# Patient Record
Sex: FEMALE | Race: WHITE | HISPANIC OR LATINO | Employment: PART TIME
[De-identification: names, ages, dates, MRNs, and addresses within clinical notes are randomized per-mention and may not be internally consistent; named-entity substitution may affect disease eponyms.]

---
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
## 2024-04-17 ENCOUNTER — OFFICE VISIT (OUTPATIENT)
Dept: FAMILY MEDICINE | Facility: CLINIC | Age: 19
End: 2024-04-17
Payer: COMMERCIAL

## 2024-04-17 VITALS
HEART RATE: 82 BPM | OXYGEN SATURATION: 97 % | DIASTOLIC BLOOD PRESSURE: 66 MMHG | HEIGHT: 67 IN | SYSTOLIC BLOOD PRESSURE: 96 MMHG | WEIGHT: 163 LBS | BODY MASS INDEX: 25.58 KG/M2 | RESPIRATION RATE: 18 BRPM | TEMPERATURE: 98.1 F

## 2024-04-17 DIAGNOSIS — N92.6 MISSED MENSES: Primary | ICD-10-CM

## 2024-04-17 PROBLEM — O21.0 HYPEREMESIS GRAVIDARUM: Status: RESOLVED | Noted: 2022-07-29 | Resolved: 2024-04-17

## 2024-04-17 LAB — HCG UR QL: POSITIVE

## 2024-04-17 PROCEDURE — 99213 OFFICE O/P EST LOW 20 MIN: CPT | Mod: GC

## 2024-04-17 PROCEDURE — 81025 URINE PREGNANCY TEST: CPT

## 2024-04-17 ASSESSMENT — ANXIETY QUESTIONNAIRES
7. FEELING AFRAID AS IF SOMETHING AWFUL MIGHT HAPPEN: MORE THAN HALF THE DAYS
1. FEELING NERVOUS, ANXIOUS, OR ON EDGE: NEARLY EVERY DAY
2. NOT BEING ABLE TO STOP OR CONTROL WORRYING: NEARLY EVERY DAY
IF YOU CHECKED OFF ANY PROBLEMS ON THIS QUESTIONNAIRE, HOW DIFFICULT HAVE THESE PROBLEMS MADE IT FOR YOU TO DO YOUR WORK, TAKE CARE OF THINGS AT HOME, OR GET ALONG WITH OTHER PEOPLE: VERY DIFFICULT
6. BECOMING EASILY ANNOYED OR IRRITABLE: MORE THAN HALF THE DAYS
3. WORRYING TOO MUCH ABOUT DIFFERENT THINGS: MORE THAN HALF THE DAYS
GAD7 TOTAL SCORE: 17
GAD7 TOTAL SCORE: 17
5. BEING SO RESTLESS THAT IT IS HARD TO SIT STILL: MORE THAN HALF THE DAYS

## 2024-04-17 ASSESSMENT — PATIENT HEALTH QUESTIONNAIRE - PHQ9
SUM OF ALL RESPONSES TO PHQ QUESTIONS 1-9: 12
5. POOR APPETITE OR OVEREATING: NEARLY EVERY DAY

## 2024-04-17 NOTE — PATIENT INSTRUCTIONS
CRISIS LINES/SERVICES  If in Immediate danger please go to the ER and/or call 9-1-1  Feeling overwhelmed and just need a supportive person to talk through a struggling time?   Toll Free [phone] or text  Support  to 25232 (hours 12 PM - 10 PM CST)  The [place] Warmline provides a naqc-[id number] approach to mental health recovery, support and wellness. Calls are answered by professionally trained Certified Peer Specialists, who have first hand experience living with a mental health condition. (hours 12 PM - 10 PM CST)    Do you feel like you might be in crisis and potentially need professional services?   National Crisis Lines:  988 - National Suicide Prevention Lifeline  6-723-RDDCUAR (1-808.355.9460) - www.IActive  5-962-294-TALK (8301) - www.suicidepreventionlifeline.org  [place]:  Crisis Text Line: Text [place] to 877401. Free support at your fingertips 24/7  People who text [place] to 269048 will be connected with a counselor. Crisis Text Line is available 24 hours a day, seven days a week.  [facility] Multilingual Crisis Line:  [phone]  [facility]:  COPE - Adult (psychiatric crisis, but cannot transport self): [phone]   COPE - Child: [phone]  Acute Psychiatric Services - [facility] (24 hour crisis walk-in and crisis line): [phone]  [address], [place]  [facility] (Emergency Psychiatric Evaluation): [phone]  [facility]:  [facility] Adult Crisis Line (crisis counseling and walk-in urgent care mental health): [phone]   Adult Residential Crisis Centers:   [employer] operates two Adult Residential Crisis Centers in the area: [facility] ([place]) and [facility] ([place])  These facilities are a step below in-patient hospital care, providing a three to ten-day stay for individuals who are at a moderate but not a high risk for self-harm. The crisis centers and other [employer] programs can be reached through their  central screening at 355-544-6968.  Domestic Violence:  [place] Domestic Violence Crisis Line (24-hour [place] crisis line) 1-432.423.7093    If in Immediate danger please go to the ER and/or call 9-1-1

## 2024-04-17 NOTE — PROGRESS NOTES
Assessment & Plan     Missed menses  19-year-old  presents with positive urine pregnancy test at home and report of her last menstrual period about 5 months ago in the setting of irregular menses.  She also has had associated nausea with some vomiting and overall fatigue.  Urine pregnancy test in office today is positive.  Patient would not like to continue the pregnancy.  Discussed obtaining dating ultrasound given unsure LMP and gestational age.  OB RN met with patient to schedule ultrasound and will follow-up on ultrasound results on 2024 and discuss next steps with patient.  - HCG qualitative urine  - US OB <14 WKS SINGLE OR FIRST GESTATION      Depression Screening Follow Up      2023     1:59 PM 2023     3:26 PM 2024     1:38 PM   PHQ   PHQ-9 Total Score 17 16 12   Q9: Thoughts of better off dead/self-harm past 2 weeks Not at all Not at all Several days        Follow Up Actions Taken  Crisis resource information provided in the After Visit Summary    Discussed the following ways the patient can remain in a safe environment:  be around others    Return in about 1 week (around 2024), or if symptoms worsen or fail to improve.    Lucius Fuentes is a 19 year old, presenting for the following health issues:  Pregnancy Test (Home test positive )        2024    11:15 AM   Additional Questions   Roomed by Ml   Accompanied by child         2024    Information    services provided? No     HPI     Patient is  1 and para 1.    Past Medical History:   Diagnosis Date    Moderate episode of recurrent major depressive disorder (H)      Patient reports last menstrual period was about 5 months ago.  She reports irregular menstrual periods.    Sexually Active: YES  Using pill for birth control previously, stopped about 5 months ago    Painful urination (Dysuria): No  Urinary frequency: No  Vaginal discharge: No  Vaginal bleeding or spotting: No  Nausea  "and vomiting: YES- vomiting almost daily for 1 week, now improved  Fatigue: YES  Taking prenatal vitamin: No    Does not wish to continue the pregnancy.  She states it has been stressful finding out that she has been pregnant as she had a really difficult pregnancy with her first child and thinks that the father of this baby may be her ex partner.         Objective    BP 96/66 (BP Location: Right arm, Patient Position: Sitting, Cuff Size: Adult Regular)   Pulse 82   Temp 98.1  F (36.7  C) (Tympanic)   Resp 18   Ht 1.702 m (5' 7\")   Wt 73.9 kg (163 lb)   LMP  (LMP Unknown)   SpO2 97%   BMI 25.53 kg/m    Body mass index is 25.53 kg/m .  Physical Exam   Constitutional: healthy, alert, no distress, and cooperative  Head: normocephalic  Cardiovascular: appears well perfused  Respiratory: breathing comfortably on RA  Musculoskeletal: extremities normal- no gross deformities noted, gait normal  Skin: no suspicious lesions or rashes on exposed skin  Neurologic: grossly normal CN  Psychiatric: mentation appears normal and affect flat        Signed Electronically by: Antonina Shields MD    "

## 2024-04-18 ENCOUNTER — HOSPITAL ENCOUNTER (OUTPATIENT)
Dept: ULTRASOUND IMAGING | Facility: CLINIC | Age: 19
Discharge: HOME | End: 2024-04-18
Attending: FAMILY MEDICINE | Admitting: FAMILY MEDICINE
Payer: COMMERCIAL

## 2024-04-18 DIAGNOSIS — N92.6 MISSED MENSES: ICD-10-CM

## 2024-04-18 PROCEDURE — 76801 OB US < 14 WKS SINGLE FETUS: CPT

## 2024-04-19 ENCOUNTER — TELEPHONE (OUTPATIENT)
Dept: FAMILY MEDICINE | Facility: CLINIC | Age: 19
End: 2024-04-19
Payer: COMMERCIAL

## 2024-04-23 NOTE — TELEPHONE ENCOUNTER
Called pt and assisted her with rescheduling [facility] appt to tomorrow at 11:20 AM with Dr Sexton.  Routed note to Dr Sexton./Shana Bales RN BSN

## 2024-04-24 ENCOUNTER — TELEPHONE (OUTPATIENT)
Dept: FAMILY MEDICINE | Facility: CLINIC | Age: 19
End: 2024-04-24

## 2024-04-24 ENCOUNTER — OFFICE VISIT (OUTPATIENT)
Dept: FAMILY MEDICINE | Facility: CLINIC | Age: 19
End: 2024-04-24
Payer: COMMERCIAL

## 2024-04-24 VITALS
OXYGEN SATURATION: 99 % | WEIGHT: 159 LBS | RESPIRATION RATE: 16 BRPM | DIASTOLIC BLOOD PRESSURE: 70 MMHG | BODY MASS INDEX: 24.9 KG/M2 | TEMPERATURE: 98 F | SYSTOLIC BLOOD PRESSURE: 106 MMHG | HEART RATE: 93 BPM

## 2024-04-24 DIAGNOSIS — Z11.3 SCREENING FOR STDS (SEXUALLY TRANSMITTED DISEASES): ICD-10-CM

## 2024-04-24 DIAGNOSIS — Z11.59 NEED FOR HEPATITIS C SCREENING TEST: Primary | ICD-10-CM

## 2024-04-24 DIAGNOSIS — Z33.2 TERMINATION OF PREGNANCY (FETUS): ICD-10-CM

## 2024-04-24 PROCEDURE — S0190 MIFEPRISTONE, ORAL, 200 MG: HCPCS | Performed by: FAMILY MEDICINE

## 2024-04-24 PROCEDURE — 99214 OFFICE O/P EST MOD 30 MIN: CPT | Performed by: FAMILY MEDICINE

## 2024-04-24 RX ORDER — MIFEPRISTONE 200 MG/1
200 TABLET ORAL ONCE
Status: COMPLETED | OUTPATIENT
Start: 2024-04-24 | End: 2024-04-24

## 2024-04-24 RX ORDER — MISOPROSTOL 200 UG/1
800 TABLET ORAL DAILY
Qty: 8 TABLET | Refills: 0 | Status: SHIPPED | OUTPATIENT
Start: 2024-04-24

## 2024-04-24 RX ORDER — MISOPROSTOL 200 UG/1
800 TABLET ORAL EVERY 4 HOURS
Qty: 8 TABLET | Refills: 0 | Status: SHIPPED | OUTPATIENT
Start: 2024-04-24 | End: 2024-04-24

## 2024-04-24 RX ADMIN — MIFEPRISTONE 200 MG: 200 TABLET ORAL at 12:00

## 2024-04-24 NOTE — PATIENT INSTRUCTIONS
MEDICATION TERMINATION OF PREGNANCY USING MIFEPRISTONE AND MISOPROSTOL    HOW DOES IT WORK?    Mifepristone and misoprostol together are medications that can be taken to end your pregnancy.      HOW WELL DOES THE MEDICATION WORK?    Medication  is highly effective. Medication  is highly effective; it has a success rate of >95% using the standard protocol. Medication  is safe. Serious complications requiring hospitalization for infection treatment or transfusion occur in <0.4% of patients under the standard protocol. Failed or incomplete medication  may require a suction procedure to complete.    WHAT DO I DO?    You took one tablet of 200 mg mifepristone today during your visit or will take it at home as directed by your provider.   After taking the mifepristone, use the misoprostol.  There are two ways to take misoprostol: vaginal or buccal (between cheek and gum in your mouth).   Vaginal Use of Misoprostol (may be inserted immediately or up to 48 hrs after mifepristone)   Wash your hands and lie down. Place the 4 misoprostol tablets one at a time up into the vagina as far as you can using your finger. It doesn t matter where in the vagina you put the pills. Each misoprostol pill contains 200 micrograms.    If your provider has recommended a second dose of misoprostol, repeat the process with an additional 4 tablets of misoprostol. If you have started to bleed, you can put the pills in your cheeks (between your cheek and your gums), instead of your vagina, for 30 minutes. See  Buccal Use of Misoprostol  below.    Buccal Use of Misoprostol (should be taken between 24-48 hrs after mifepristone)   Place 2 tablets of misoprostol in each cheek (between your cheek and your gums), four tablets total.    Leave them in your cheeks for 30 minutes to dissolve.    After 30 minutes swallow the pills with a large glass of water.   If your provider has recommended a second dose of misoprostol,  repeat steps i. through iii. Above.   You may take ibuprofen (800 mg total) and/or acetaminophen (650mg) by mouth one hour before using the misoprostol. This may help with cramping. See further information on pain management below.       WHAT HAPPENS NEXT?   Vaginal bleeding with clots is an expected part of this process. The cramps and bleeding may be stronger than your normal period. The cramps and heavy bleeding usually start 2 to 4 hours after you use the misoprostol pills. This heavy bleeding means the pills are working. After the pregnancy tissue passes, the bleeding will slow down and get lighter and lighter. It is normal to pass small clots and sometimes the bleeding may seem to increase when you get up suddenly or go to the toilet. Bleeding may continue on and off for up to 4 weeks.     Lower abdominal cramping pain, especially in the middle of your lower abdomen can occur any time after you take the misoprostol.? Cramping may be similar or sometimes much greater than with a menstrual period and can last for a couple of days. If you continue to have pain and cramps after taking the ibuprofen (as directed above), then you can use additional pain medicine such as acetaminophen (Tylenol).?   Nausea, diarrhea: These symptoms should get better a few hours after using the pills and should not last longer than 24 hours.    Fever and chills: You may have fever and chills the day you take the misoprostol. It is NOT normal to have a fever after that. Call us right away if you do. It could be a sign that you are getting an infection.   You will receive a phone call from a clinic nurse within a week of your visit.    You can expect a period in 4 to 8 weeks after using mifepristone and misoprostol but this varies quite a bit depending upon a person s normal menstrual cycle.      WHAT CAN I TAKE FOR PAIN, NAUSEA, DIARRHEA?    Pain:    Take ibuprofen (Motrin or Advil) 800 mg every 8 hours as needed for pain. Take this  with food to avoid an upset stomach. You may also alternate this with acetaminophen (Tylenol) 650mg every 6 hours for added pain control.   Comfort: A hot pack may help with cramps. You can also drink some hot tea. Rest in a soothing place.    Nausea   Take ondansetron or promethazine as needed for nausea and vomiting as needed for nausea and vomiting if prescribed.   Diarrhea   Take Loperamide as needed for diarrhea. Take 2 capsules after the first loose bowel movement. Can take 1 capsule after each additional loose stool. Do not take more than 8 capsules in a day.     WHEN SHOULD I CALL MY HEALTHCARE PROVIDER?    Your bleeding soaks through more than 2 maxi pads per hour for 2 hours in a row   You do not bleed within 24 hours after taking the misoprostol   You continue to feel sick more than 24 hours after taking the misoprostol:   Fever on an oral thermometer of 100.4 degrees Fahrenheit, severe stomach area (abdominal) pain, weakness, nausea, vomiting, or diarrhea     The clinic phone is answered 24 hours per day. If it is after clinic hours, leave a message with the answering service and a health care provider will call you back. Please call if you have any questions, think something is going wrong, or think you have an emergency. If you do not receive a call back within an hour, go to the nearest emergency room.              FEELINGS AFTER ENDING PREGNANCY    People have many different feelings after using the medications to end a pregnancy. The most common emotion people report is relief, but people can also feel many other emotions. If you think your emotions are not what they should be, please talk to us.        References:   American College of Obstetricians and Gynecologists  Committee on Practice Bulletins--Gynecology, Society of Family Planning. Medication  Up to 70 Days of Gestation: ACOG Practice Bulletin, Number 225. Obstet Gynecol. 2020;136(4):e31-e47. doi: 10.1097/AOG.[id number].     Reproductive Health Access Project. Medication  Aftercare Instructions (vaginal miso). May 2022. Available at: https://www.reproductiveaccess.org/wp-content/uploads/[email or web address]_CcxgnuwRcwjJcr-final.pdf   Reproductive Health Access Project. Medication  Aftercare Instructions (buccal miso). May 2022. Available at: https://www.reproductiveaccess.org/wp-content/uploads/-english-buccal-med-ab-aftercare_final.pdf

## 2024-04-24 NOTE — PROGRESS NOTES
2024     Assessment and Plan:  Gina Velasco is a 19 year old  at 9w5d as dated by US who desires termination of pregnancy with medication .     Based the pre-medication  ultrasound assessment, ultrasound prior to  IS indicated, and it was completed on 24.    The patient is appropriate for medication  with:  mifepristone and misoprostol.     Prior to the administration/prescribing of mifepristone, the patient received the medication guide and signed the patient agreement.      Mifepristone administered in clinic (Lot #[id number]; exp Oct 2027). Patient plans to take misoprostol by buccal route.   As patient is 9+0 - 11+0 weeks gestation, two doses of buccal misoprostol are recommended.    Counseled patient on   - expected bleeding: Bleeding typically starts 2-4 hours after misoprostol and can be heavy for up to 2 hours. Once pregnancy tissue passes, bleeding should slow down to menstrual type flow but can last up to 2 weeks. Patient counseled to contact our clinic or present to Emergency Department in the case of heavy bleeding (soaking more than two maxi pads per hour for 2 consecutive hours). Also counseled to contact clinic if no bleeding within 24 hours after taking misoprostol.  - pain: Counseled patient that the most severe pain occurs approximately 2-4 hours after misoprostol use and lasts 1-2 hours. Advised patient to take ibuprofen 800 mg with misoprostol and repeat as needed every 8 hours. Patient may also alternate with acetaminophen for added pain control (acetaminophen 650 mg every 6 hours).   - potential side effects of misoprostol: nausea, vomiting, diarrhea, headache, dizziness, and thermoregulatory effects such as fever, warmth, hot flushes, or chills  - follow up plan options:   Home Pregnancy Test - Take a urine pregnancy test four weeks after taking  medication(s).  Blood Test - Get a blood test on the day of your appointment or the day  you take your first dose of medication to measure the amount of pregnancy hormone (HCG) in your blood.  Get another HCG blood test 1-2 weeks after taking  medication(s).   Ultrasound - Get a vaginal ultrasound 1-2 weeks after taking  medication(s).  Patient plans a home pregnancy test  for follow up.    Chart routed to RN team (TOP Triage [id number]) who will follow up with patient via telephone within 1 week after clinic visit to assess patient.    Patricia Sexton MD     Subjective:  Gina Velasco is a 19 year old  at 9w5d here today for medication .  Patient has been counseled on pregnancy options and desires medical termination of pregnancy.     Objective:  Dating:  No LMP recorded (lmp unknown).  Ultrasound: single intrauterine pregnancy, EDC 24      OB History    Para Term  AB Living   1 1 1 0 0 1   SAB IAB Ectopic Multiple Live Births   0 0 0 0 1      # Outcome Date GA Lbr [place]/2nd Weight Sex Type Anes PTL Lv   1 Term 23 40w2d 02:55 / 00:46 3.56 kg (7 lb 13.6 oz) F Vag-Spont Local N PA      Birth Comments: 2nd degree perineal lac repaired      Name: Cathy Velasoc      Apgar1: 8  Apgar5: 9        Past Medical History:   Diagnosis Date    Hyperemesis gravidarum 2022    HYPEREMESIS/WEIGHT LOSS: 22 Weight gain inadequate: 2.54 kg (5 lb 9.6 oz) to date, out of recommended total of 15-25 lbs (pregravid BMI 25-29.9). Gaining weight but still inadequate. Nausea and vomiting improving. She is still taking prescribed medication but only as needed. Patient seen in the [facility] ED for upper abdominal pain. Prescribed famotidine 20 mg for acid reflux. Symptoms    Moderate episode of recurrent major depressive disorder (H)        Past Surgical History:   Procedure Laterality Date    TONSILLECTOMY & ADENOIDECTOMY  2009    WISDOM TOOTH EXTRACTION         Current Outpatient Medications   Medication Sig Dispense Refill     "misoprostol (CYTOTEC) 200 MCG tablet Place 4 tablets (800 mcg) inside cheek daily 8 tablet 0     Current Facility-Administered Medications   Medication Dose Route Frequency Provider Last Rate Last Admin    miFEPRIStone (MIFEPREX) tablet 200 mg  200 mg Oral Once            No Known Allergies    Social History     Tobacco Use    Smoking status: Never    Smokeless tobacco: Never   Vaping Use    Vaping status: Never Used   Substance Use Topics    Alcohol use: No    Drug use: No        Vitals:    04/24/24 1126   BP: 106/70   Pulse: 93   Resp: 16   Temp: 98  F (36.7  C)   TempSrc: Oral   SpO2: 99%   Weight: 72.1 kg (159 lb)       Estimated body mass index is 24.9 kg/m  as calculated from the following:    Height as of 4/17/24: 1.702 m (5' 7\").    Weight as of this encounter: 72.1 kg (159 lb).     Gen: well-appearing, cooperative, well-groomed    Imaging Report Reviewed  EXAM: US OB < 14 WEEKS SINGLE-TRANSABDOMINAL  LOCATION: [facility]  DATE: 4/18/2024     INDICATION: Dating, LMP about 5 mo ago, irregular menses, positive UPT  COMPARISON: None.  TECHNIQUE: Transabdominal scans were performed. Endovaginal ultrasound was performed to better visualize the embryo.     FINDINGS:  UTERUS: Single normal appearing intrauterine gestation sac.  CRL: Measures 2.1 cm, equals 8 weeks 6 days gestation.  FETAL HEART RATE: 174 bpm.  AMNIOTIC FLUID: Normal.  Yolk sac: Normal.  PLACENTA: Not yet formed. Small (3.9 x 1.5 x 1.3 cm) sub-chorionic hemorrhage noted on the left side of the gestational sac.     RIGHT OVARY: Normal, containing a normal appearing corpus luteum.  LEFT OVARY: Normal.                                                                      IMPRESSION:   1.  Single living intrauterine gestation at 8 weeks 6 days gestation, EDC 11/22/2024.          "

## 2024-04-24 NOTE — TELEPHONE ENCOUNTER
Pt was seen today, 4/24, for a MTOP with Dr. Sexton.    Will pend encounter for a few days and then call pt for MTOP follow up questions.    Kaitlyn Dominguez RN

## 2024-04-25 NOTE — TELEPHONE ENCOUNTER
Shana Bales, RN  You32 minutes ago (8:07 AM)     GERMAN Hamilton,  I am a RN Care Coordinator for this pt at [facility].  I helped her to schedule this appointment and I plan to do the follow-up call with her so you do not need to call her.  [facility] RN's will now be scheduling and following up all of our [facility] patients having MTOP but will continue to forward messages on any patients that are not currently our patients.

## 2024-04-30 ENCOUNTER — TELEPHONE (OUTPATIENT)
Dept: FAMILY MEDICINE | Facility: CLINIC | Age: 19
End: 2024-04-30
Payer: COMMERCIAL

## 2024-05-20 ENCOUNTER — OFFICE VISIT (OUTPATIENT)
Dept: FAMILY MEDICINE | Facility: CLINIC | Age: 19
End: 2024-05-20
Payer: COMMERCIAL

## 2024-05-20 VITALS
HEART RATE: 95 BPM | RESPIRATION RATE: 20 BRPM | WEIGHT: 161.8 LBS | BODY MASS INDEX: 25.39 KG/M2 | SYSTOLIC BLOOD PRESSURE: 111 MMHG | HEIGHT: 67 IN | DIASTOLIC BLOOD PRESSURE: 74 MMHG | TEMPERATURE: 98.9 F | OXYGEN SATURATION: 100 %

## 2024-05-20 DIAGNOSIS — N92.6 MISSED MENSES: Primary | ICD-10-CM

## 2024-05-20 DIAGNOSIS — N93.9 VAGINAL BLEEDING: ICD-10-CM

## 2024-05-20 LAB — HCG UR QL: NEGATIVE

## 2024-05-20 PROCEDURE — 81025 URINE PREGNANCY TEST: CPT

## 2024-05-20 PROCEDURE — 99213 OFFICE O/P EST LOW 20 MIN: CPT | Mod: GC

## 2024-05-20 ASSESSMENT — PATIENT HEALTH QUESTIONNAIRE - PHQ9: SUM OF ALL RESPONSES TO PHQ QUESTIONS 1-9: 20

## 2024-05-20 NOTE — PROGRESS NOTES
Preceptor Attestation:   Patient seen, evaluated and discussed with the resident. I have verified the content of the note, which accurately reflects my assessment of the patient and the plan of care.   Supervising Physician:  Reynold Enamorado MD

## 2024-05-20 NOTE — PROGRESS NOTES
Assessment & Plan     Follow-up for medical   Vaginal bleeding  Patient took mifepristone for termination of pregnancy on 2024.  By ultrasound she was 9 weeks gestation.  Since then has been having constant bleeding with passage of pea-sized clots.  Soaking 3-4 pads per day.  No fevers or abdominal pain.  Believes she passed products of conception.  On exam she has normal vital signs and no tenderness to palpation in the abdomen.  Pregnancy test negative today.  Differential includes retained products of conception, cervical tear, placenta accreta.  Plan to get ultrasound to further work this up.   Recommended trying a few doses of ibuprofen as well. Patient has severe aversion to needles and was not willing to get hemoglobin drawn today.  No signs for severe anemia on exam.  Close follow-up.   - HCG qualitative urine  - US Pelvic Complete with Transvaginal    Depression Screening Follow Up   Follow Up Actions Taken  Patient to follow up with PCP.  Clinic staff to schedule appointment if able.  Patient declined referral.    Discussed the following ways the patient can remain in a safe environment:  be around others      No follow-ups on file.    Lucius   Gina is a 19 year old, presenting for the following health issues:  Follow Up (Pos pregnancy test sometime between - . MTOP done . )      2024     1:18 PM   Additional Questions   Roomed by    Accompanied by self         2024    Information    services provided? No     HPI     Gina Velasco is a 19 year old  at 9w5d as dated by US who desires termination of pregnancy with medication . Mifepristone administered in clinic on 2024.     Only 2 days of no bleeding since taking Mifepristone. Pea sized clots. 3-4 pads per day that are completely soaked. Not taking any medications. No fevers. No cramping anymore. Believes she passed products of conception. Took home pregnancy  "test on April 30th that was positive.        Objective    /74   Pulse 95   Temp 98.9  F (37.2  C) (Tympanic)   Resp 20   Ht 1.702 m (5' 7.01\")   Wt 73.4 kg (161 lb 12.8 oz)   LMP  (LMP Unknown)   SpO2 100%   Breastfeeding No   BMI 25.34 kg/m    Body mass index is 25.34 kg/m .  Physical Exam   GENERAL: alert and no distress  ABDOMEN: soft, nontender, no hepatosplenomegaly, no masses     Results for orders placed or performed in visit on 05/20/24 (from the past 24 hour(s))   HCG qualitative urine   Result Value Ref Range    hCG Urine Qualitative Negative Negative           Signed Electronically by: Hugo Jones MD  "

## 2024-06-23 ENCOUNTER — HEALTH MAINTENANCE LETTER (OUTPATIENT)
Age: 19
End: 2024-06-23

## 2024-11-08 ENCOUNTER — OFFICE VISIT (OUTPATIENT)
Dept: URGENT CARE | Facility: URGENT CARE | Age: 19
End: 2024-11-08
Payer: COMMERCIAL

## 2024-11-08 VITALS
OXYGEN SATURATION: 99 % | HEART RATE: 67 BPM | TEMPERATURE: 98.6 F | BODY MASS INDEX: 26.46 KG/M2 | WEIGHT: 169 LBS | RESPIRATION RATE: 16 BRPM | DIASTOLIC BLOOD PRESSURE: 77 MMHG | SYSTOLIC BLOOD PRESSURE: 114 MMHG

## 2024-11-08 DIAGNOSIS — H60.392 INFECTIVE OTITIS EXTERNA, LEFT: Primary | ICD-10-CM

## 2024-11-08 PROCEDURE — 99213 OFFICE O/P EST LOW 20 MIN: CPT | Performed by: PHYSICIAN ASSISTANT

## 2024-11-08 RX ORDER — CIPROFLOXACIN AND DEXAMETHASONE 3; 1 MG/ML; MG/ML
4 SUSPENSION/ DROPS AURICULAR (OTIC) 2 TIMES DAILY
Qty: 7.5 ML | Refills: 0 | Status: SHIPPED | OUTPATIENT
Start: 2024-11-08

## 2024-11-08 ASSESSMENT — PAIN SCALES - GENERAL: PAINLEVEL_OUTOF10: EXTREME PAIN (8)

## 2024-11-08 ASSESSMENT — ENCOUNTER SYMPTOMS
FEVER: 0
RHINORRHEA: 0

## 2024-11-08 NOTE — PROGRESS NOTES
Assessment & Plan:        ICD-10-CM    1. Infective otitis externa, left  H60.392 ciprofloxacin-dexAMETHasone (CIPRODEX) 0.3-0.1 % otic suspension            Plan/Clinical Decision Making:    Patient with recent picking at ear with paper clip, having acute pain. Tenderness, swelling and erythema of canal. No acute injury of TM seen. Discussed avoid putting anything in ear.   Ciprodex prescribed.       Return if symptoms worsen or fail to improve, for in 2-3 days.     At the end of the encounter, I discussed results, diagnosis, medications. Discussed red flags for immediate return to clinic/ER, as well as indications for follow up if no improvement. Patient understood and agreed to plan. Patient was stable for discharge.        My Merritt PA-C on 11/8/2024 at 5:46 PM          Subjective:     HPI:    Gina is a 19 year old female who presents to clinic today for the following health issues:  Chief Complaint   Patient presents with    Ear Problem     Patient has anxiety and with her anxiety she picks and she picked at left ear and noticed pain rated at a 8/10. Patient declines any fevers or drainage.      HPI    Patient developed left ear pain 3 days ago. Had slight whitish drainage from ear.   Patient was using paperclip to pick at ears.     Review of Systems   Constitutional:  Negative for fever.   HENT:  Positive for ear pain. Negative for congestion and rhinorrhea.          Patient Active Problem List   Diagnosis    Rubella non-immune status, antepartum    Need for varicella vaccine    Infection due to 2019 novel coronavirus    Missed menses        Past Medical History:   Diagnosis Date    Hyperemesis gravidarum 07/29/2022    HYPEREMESIS/WEIGHT LOSS: 12/7/22 Weight gain inadequate: 2.54 kg (5 lb 9.6 oz) to date, out of recommended total of 15-25 lbs (pregravid BMI 25-29.9). Gaining weight but still inadequate. Nausea and vomiting improving. She is still taking prescribed medication but only as needed.  Patient seen in the [facility] ED for upper abdominal pain. Prescribed famotidine 20 mg for acid reflux. Symptoms    Moderate episode of recurrent major depressive disorder (H)        Social History     Tobacco Use    Smoking status: Never    Smokeless tobacco: Never   Substance Use Topics    Alcohol use: No             Objective:     Vitals:    11/08/24 1729   BP: 114/77   Pulse: 67   Resp: 16   Temp: 98.6  F (37  C)   TempSrc: Tympanic   SpO2: 99%   Weight: 76.7 kg (169 lb)         Physical Exam   EXAM:   Pleasant, alert, appropriate appearance. NAD.  Head Exam: Normocephalic, atraumatic.  Eye Exam:  PERRLA, EOMI, non icteric/injection.    Ear Exam: Right TM grey without bulging.Left TM difficulty to see, appears intact, canal with erythema, swelling, tenderness.   Nose Exam: Normal external nose.    OroPharynx Exam:  Moist mucous membranes. No erythema, pharynx without exudate or hypertrophy.  Neck/Thyroid Exam:  neck adenopathy      Results:  No results found for any visits on 11/08/24.

## 2024-11-08 NOTE — NURSING NOTE
Gina Velasco's chief complaint for this visit includes:  Chief Complaint   Patient presents with    Ear Problem     Patient has anxiety and with her anxiety she picks and she picked at left ear and noticed pain rated at a 8/10. Patient declines any fevers or drainage.      PCP: Michael Manriquez    Referring Provider:  No referring provider defined for this encounter.    /77   Pulse 67   Temp 98.6  F (37  C) (Tympanic)   Resp 16   Wt 76.7 kg (169 lb)   SpO2 99%   BMI 26.46 kg/m    Extreme Pain (8)      No Known Allergies      Do you need any medication refills at today's visit? No    Shana Taylor MA

## 2025-03-03 ENCOUNTER — TELEPHONE (OUTPATIENT)
Dept: FAMILY MEDICINE | Facility: CLINIC | Age: 20
End: 2025-03-03
Payer: COMMERCIAL

## 2025-03-03 NOTE — TELEPHONE ENCOUNTER
Patient Quality Outreach    Patient is due for the following:   Cervical Cancer Screening - PAP Needed    Action(s) Taken:   Schedule a office visit for PAP    Type of outreach:    Phone, left message for patient/parent to call back.    Questions for provider review:    None           Romelia To MA

## 2025-07-12 ENCOUNTER — HEALTH MAINTENANCE LETTER (OUTPATIENT)
Age: 20
End: 2025-07-12